# Patient Record
Sex: MALE | Race: WHITE | NOT HISPANIC OR LATINO
[De-identification: names, ages, dates, MRNs, and addresses within clinical notes are randomized per-mention and may not be internally consistent; named-entity substitution may affect disease eponyms.]

---

## 2020-08-14 ENCOUNTER — TRANSCRIBE ORDERS (OUTPATIENT)
Dept: OTHER | Age: 65
End: 2020-08-14

## 2020-08-14 DIAGNOSIS — N35.919 URETHRAL STRICTURE: Primary | ICD-10-CM

## 2020-09-01 NOTE — TELEPHONE ENCOUNTER
MEDICAL RECORDS REQUEST   [facility]  Urology Clinic  [address] [place] [address]  PHONE: [phone]  Fax: [phone]        FUTURE VISIT INFORMATION                                                   Jerome JOSE Gayle, : 1955 scheduled for future visit at [facility]    APPOINTMENT INFORMATION:    Date: 10/12/20 3:30PM    Provider:  Wily Canchola MD    Reason for Visit/Diagnosis: Urethral stricture     REFERRAL INFORMATION:    Referring provider:  N/A    Specialty: N/A    Referring providers clinic:  [facility] contact number:  N/A    RECORDS REQUESTED FOR VISIT                                                     NOTES  STATUS/DETAILS   OFFICE NOTE from referring provider  yes   OFFICE NOTE from other specialist  yes   DISCHARGE SUMMARY from hospital  no   DISCHARGE REPORT from the ER  no   OPERATIVE REPORT  yes   MEDICATION LIST  yes   URETHRAL STRICTURE     RUG (IMAGES & REPORT)  no   VCUG  (IMAGES & REPORT)  no     PRE-VISIT CHECKLIST      Record collection complete Yes- [facility] recs in [facility]  CSS req IMGS -   Appointment appropriately scheduled           (right time/right provider) Yes   MyChart activation If no, please explain: In process    Questionnaire complete If no, please explain: In process      Completed by: Cindy Carvalho

## 2020-10-12 ENCOUNTER — PRE VISIT (OUTPATIENT)
Dept: UROLOGY | Facility: CLINIC | Age: 65
End: 2020-10-12

## 2020-10-28 ENCOUNTER — DOCUMENTATION ONLY (OUTPATIENT)
Dept: CARE COORDINATION | Facility: CLINIC | Age: 65
End: 2020-10-28

## 2020-11-13 ENCOUNTER — PRE VISIT (OUTPATIENT)
Dept: UROLOGY | Facility: CLINIC | Age: 65
End: 2020-11-13

## 2020-11-13 DIAGNOSIS — Q64.32 CONGENITAL STRICTURE OF URETHRA: Primary | ICD-10-CM

## 2020-11-13 NOTE — TELEPHONE ENCOUNTER
Reason for visit: urethral stricture consult     Relevant information: referred by [facility]    Records/imaging/labs/orders: orders placed for imaging    Pt called: message sent to scheduling team    At Rooming: flow/pvr

## 2020-11-17 ENCOUNTER — TELEPHONE (OUTPATIENT)
Dept: UROLOGY | Facility: CLINIC | Age: 65
End: 2020-11-17

## 2020-11-17 NOTE — TELEPHONE ENCOUNTER
Left message for pt to call and move appt up to 11/30 at 1:45 with Dr. Gonzales. Pt also need XRUG/VCUG imaging done prior to the appt, it is currently scheduled for 11:00AM on 11/30. If pt does not want to move appt up he needs to schedule imaging prior to appt on 12/14.

## 2020-11-17 NOTE — TELEPHONE ENCOUNTER
----- Message from Randa Carlisle CMA sent at 11/13/2020 11:44 AM CST -----  Regarding: URGENT - imaging needed  Pt needs imaging before he sees Dr. Canchola, please offer him a sooner appointment if possible.    Randa

## 2020-11-19 ENCOUNTER — TELEPHONE (OUTPATIENT)
Dept: UROLOGY | Facility: CLINIC | Age: 65
End: 2020-11-19

## 2020-11-19 NOTE — TELEPHONE ENCOUNTER
[facility] Call Center    Phone Message    May a detailed message be left on voicemail: yes     Reason for Call: Other: Hunter reports self cathing has been working well and he is wondering if he still needs to come in 11/30. Please call him to advise.      Action Taken: Other:  Urology    Travel Screening: Not Applicable

## 2020-11-24 NOTE — TELEPHONE ENCOUNTER
Patient was notified that is a new patient, so we cannot offer advice if it's necessary to keep the appts or not.  If he states he isn't having any difficulties then he can cancel.       Patient would contact his referring physician to see if he it is ok for him to cancel this appointment.      Yasmine Duenas MA

## 2020-11-24 NOTE — TELEPHONE ENCOUNTER
Yusuf Mcclelland     This is a new patient, so we cannot offer advice if it's necessary to keep the appts or not.  If he states he isn't having any difficulties then he can cancel.     Thanks,   Neha

## 2020-11-24 NOTE — TELEPHONE ENCOUNTER
[facility] Call Center    Phone Message    May a detailed message be left on voicemail: yes     Reason for Call: Other: Pt reports that self cathing is working well. With covid, he doesn't want to come into the clinic unless he really has to, so he is wondering if he needs these appointments anymore or if they can just be cancelled. Also, he is very busy with work until January so he's wondering if the appointments are essential, can they be done after january instead. Please call back.     Action Taken: Message routed to:  [facility] urology    Travel Screening: Not Applicable